# Patient Record
Sex: FEMALE | Race: WHITE | NOT HISPANIC OR LATINO | Employment: FULL TIME | URBAN - METROPOLITAN AREA
[De-identification: names, ages, dates, MRNs, and addresses within clinical notes are randomized per-mention and may not be internally consistent; named-entity substitution may affect disease eponyms.]

---

## 2021-10-08 NOTE — PROGRESS NOTES
Chief Complaint        Cirrhosis    History of Present Illness      Saida Ramírez is a 40 y.o. female who presents to [facility] as a new patient with a history of alcoholism and a new diagnosis of cirrhosis.  Patient reports that she was seen in the emergency department for left lower back and flank pain.  She had a CT scan done of the abdomen and pelvis without contrast while in the emergency department which displayed hepatic cirrhosis.  Patient reports she has been drinking for many years 1/5 of whiskey a day.  She reports that she stopped for about a month after being seen in the emergency department and hearing the word cirrhosis but she is started back.  She reports her uncle had cirrhosis as well.  No other liver disease in the family.  She denies IV drug abuse, Tylenol use, tattoos.  Patient denies abdominal swelling, leg swelling, confusion, yellowing of the skin or eyes.  Patient reports poor appetite worsening reflux and nausea.  She is on anything currently for reflux.  Patient denies fever, vomiting, weight loss, night sweats, melena, hematochezia, hematemesis.    CT abdomen performed on 06/5/2021 minimal amount of fluid surrounding the liver contour of the liver appears moderately nodular which can be seen in hepatic cirrhosis and luis carlos mesentery with the differentials of mesenteric panniculitis, mesenteric lymphoma and inflammatory bowel disease it could also be seen with hepatic cirrhosis  .  Most recent labs performed on 06/25/2021 hemoglobin 10.9, platelets 141, creatinine 0.72, sodium 143,AST-26, ALT-15 acute hepatitis panel negative.    Patient has never had a colonoscopy or EGD.        Results       Result Review :   The following data was reviewed by: Paulette Ramirez NP on 10/11/2021              Past Medical History       Past Medical History:   Diagnosis Date   • Hyperthyroidism        Past Surgical History:   Procedure Laterality Date   • ANKLE SURGERY  "Right          Current Outpatient Medications:   •  citalopram (CeleXA) 20 MG tablet, Take 20 mg by mouth Daily., Disp: , Rfl:   •  hydrOXYzine pamoate (VISTARIL) 25 MG capsule, Take 1 capsule by mouth Daily., Disp: , Rfl:   •  levothyroxine (SYNTHROID, LEVOTHROID) 125 MCG tablet, Take 125 mcg by mouth Daily., Disp: , Rfl:   •  pantoprazole (PROTONIX) 40 MG EC tablet, Take 1 tablet by mouth Daily., Disp: 30 tablet, Rfl: 5     No Known Allergies    Family History   Problem Relation Age of Onset   • Colon cancer Mother    • Cirrhosis Paternal Uncle         Social History     Social History Narrative   • Not on file       Objective       Objective     Vital Signs:   /72 (BP Location: Left arm, Patient Position: Sitting, Cuff Size: Adult)   Pulse 99   Ht 162.6 cm (64\")   Wt 84.2 kg (185 lb 9.6 oz)   SpO2 98%   BMI 31.86 kg/m²     Body mass index is 31.86 kg/m².    Physical Exam  Constitutional:       General: She is not in acute distress.     Appearance: Normal appearance. She is well-developed and normal weight.   Eyes:      Conjunctiva/sclera: Conjunctivae normal.      Pupils: Pupils are equal, round, and reactive to light.      Visual Fields: Right eye visual fields normal and left eye visual fields normal.   Cardiovascular:      Rate and Rhythm: Normal rate and regular rhythm.      Heart sounds: Normal heart sounds.   Pulmonary:      Effort: Pulmonary effort is normal. No retractions.      Breath sounds: Normal breath sounds and air entry.      Comments: Inspection of chest: normal appearance  Abdominal:      General: Bowel sounds are normal.      Palpations: Abdomen is soft.      Tenderness: There is no abdominal tenderness.      Comments: No appreciable hepatosplenomegaly   Musculoskeletal:      Cervical back: Neck supple.      Right lower leg: No edema.      Left lower leg: No edema.   Lymphadenopathy:      Cervical: No cervical adenopathy.   Skin:     Findings: No lesion.      Comments: Turgor normal "   Neurological:      Mental Status: She is alert and oriented to person, place, and time.   Psychiatric:         Mood and Affect: Affect normal.      Comments: Tearful and anxious              Assessment & Plan          Assessment and Plan    Diagnoses and all orders for this visit:    1. Alcoholic cirrhosis of liver without ascites (HCC) (Primary)  -     Hepatitis Panel, Acute; Future  -     CBC & Differential; Future  -     Comprehensive Metabolic Panel; Future  -     Ferritin; Future  -     Iron Profile  -     Protime-INR  -     Alpha - 1 - Antitrypsin Phenotype; Future  -     TELLY; Future  -     Anti-Smooth Muscle Antibody Titer; Future  -     Ceruloplasmin; Future  -     Mitochondrial Antibodies, M2; Future  -     US Abdomen Limited; Future  -     pantoprazole (PROTONIX) 40 MG EC tablet; Take 1 tablet by mouth Daily.  Dispense: 30 tablet; Refill: 5  -     Vitamin B12 & Folate; Future  -     Ambulatory Referral to Psychiatry    2. Depression, unspecified depression type  -     Ambulatory Referral to Psychiatry    3. Anxiety  -     Ambulatory Referral to Psychiatry    4. Alcohol dependence with unspecified alcohol-induced disorder (HCC)  -     Ambulatory Referral to Psychiatry      40-year-old female presenting the office today as a new patient with a new diagnosis of cirrhosis, anxiety, depression and alcohol dependence.  We have discussed liver disease extensively.  I have strongly encouraged the patient to stop drinking.  We have discussed her anxiety and depression and patient wishes to see a psychiatrist.  I have placed a referral for psychiatry.  I have educated the patient the benefits of seeing a therapist as well as joining a group such as AA.  I have ordered further evaluation related to the patient's CT scan displaying cirrhosis with the labs noted above to include: Hepatitis panel, CBC, CMP, ferritin, iron profile, PT/INR, alpha-1 antitrypsin, TELLY, smooth muscle, ceruloplasmin, AMA.  I have also  ordered an ultrasound for evaluation of the liver.  We have discussed the possible etiology related to elevated liver enzymes.  We discussed the importance of living a healthy lifestyle to include weight loss of 10%, cutting back on carbs, sugars and fried fatty foods, and limiting intake of soda and sugary drinks.  I have recommended adding vitamin E into the patient's current regimen.  We will call the patient with lab results and ultrasound results.  Patient is agreeable to this plan will call with any questions or concerns.  3-month follow-up.      I spent 65 minutes caring for Saida on this date of service. This time includes time spent by me in the following activities:preparing for the visit, reviewing tests, obtaining and/or reviewing a separately obtained history, performing a medically appropriate examination and/or evaluation , counseling and educating the patient/family/caregiver, ordering medications, tests, or procedures, documenting information in the medical record, independently interpreting results and communicating that information with the patient/family/caregiver and care coordination    Follow Up       Follow Up   Return in about 3 months (around 1/11/2022).  Patient was given instructions and counseling regarding her condition or for health maintenance advice. Please see specific information pulled into the AVS if appropriate.

## 2021-10-11 ENCOUNTER — OFFICE VISIT (OUTPATIENT)
Dept: GASTROENTEROLOGY | Facility: CLINIC | Age: 41
End: 2021-10-11

## 2021-10-11 ENCOUNTER — PREP FOR SURGERY (OUTPATIENT)
Dept: OTHER | Facility: HOSPITAL | Age: 41
End: 2021-10-11

## 2021-10-11 VITALS
BODY MASS INDEX: 31.69 KG/M2 | SYSTOLIC BLOOD PRESSURE: 122 MMHG | HEART RATE: 99 BPM | WEIGHT: 185.6 LBS | HEIGHT: 64 IN | DIASTOLIC BLOOD PRESSURE: 72 MMHG | OXYGEN SATURATION: 98 %

## 2021-10-11 DIAGNOSIS — K70.30 ALCOHOLIC CIRRHOSIS OF LIVER WITHOUT ASCITES (HCC): Primary | ICD-10-CM

## 2021-10-11 DIAGNOSIS — F41.9 ANXIETY: ICD-10-CM

## 2021-10-11 DIAGNOSIS — F10.29 ALCOHOL DEPENDENCE WITH UNSPECIFIED ALCOHOL-INDUCED DISORDER (HCC): ICD-10-CM

## 2021-10-11 DIAGNOSIS — F32.A DEPRESSION, UNSPECIFIED DEPRESSION TYPE: ICD-10-CM

## 2021-10-11 DIAGNOSIS — K21.9 GASTROESOPHAGEAL REFLUX DISEASE, UNSPECIFIED WHETHER ESOPHAGITIS PRESENT: ICD-10-CM

## 2021-10-11 PROCEDURE — 99205 OFFICE O/P NEW HI 60 MIN: CPT | Performed by: NURSE PRACTITIONER

## 2021-10-11 RX ORDER — LEVOTHYROXINE SODIUM 0.12 MG/1
125 TABLET ORAL DAILY
COMMUNITY

## 2021-10-11 RX ORDER — PANTOPRAZOLE SODIUM 40 MG/1
40 TABLET, DELAYED RELEASE ORAL DAILY
Qty: 30 TABLET | Refills: 5 | Status: SHIPPED | OUTPATIENT
Start: 2021-10-11

## 2021-10-11 RX ORDER — HYDROXYZINE PAMOATE 25 MG/1
1 CAPSULE ORAL DAILY
COMMUNITY
Start: 2021-09-21

## 2021-10-11 RX ORDER — CITALOPRAM 20 MG/1
20 TABLET ORAL DAILY
COMMUNITY
Start: 2021-09-21

## 2021-10-19 ENCOUNTER — PATIENT ROUNDING (BHMG ONLY) (OUTPATIENT)
Dept: GASTROENTEROLOGY | Facility: CLINIC | Age: 41
End: 2021-10-19

## 2021-10-19 NOTE — PROGRESS NOTES
October 19, 2021    Hello, may I speak with Saida Ramírez?    My name is Anni Capone    I am  with [facility]  [address] [place] [address] [phone].    Before we get started may I verify your date of birth? 1980    I am calling to officially welcome you to our practice and ask about your recent visit. Is this a good time to talk? No-Voicemail Left     Tell me about your visit with us. What things went well?        We're always looking for ways to make our patients' experiences even better. Do you have recommendations on ways we may improve?     Overall were you satisfied with your first visit to our practice?        I appreciate you taking the time to speak with me today. Is there anything else I can do for you?      Thank you, and have a great day.

## 2021-10-25 ENCOUNTER — TELEPHONE (OUTPATIENT)
Dept: GASTROENTEROLOGY | Facility: CLINIC | Age: 41
End: 2021-10-25

## 2021-10-25 NOTE — TELEPHONE ENCOUNTER
I left patient a voicemail reminding her to have her labs performed at her earliest convenience. I left a call back number if there are any questions or concerns.

## 2021-11-29 ENCOUNTER — TELEPHONE (OUTPATIENT)
Dept: GASTROENTEROLOGY | Facility: CLINIC | Age: 41
End: 2021-11-29

## 2021-12-07 ENCOUNTER — TELEPHONE (OUTPATIENT)
Dept: GASTROENTEROLOGY | Facility: CLINIC | Age: 41
End: 2021-12-07

## 2021-12-07 NOTE — TELEPHONE ENCOUNTER
Patient had an order to be scheduled at [facility] for an abdominal ultrasound. I was able to schedule patient at [facility] for 12/14/2021 at 8:00 AM. Arrival time of 7:30 AM for registration. NPO after midnight. I left patient a detailed voicemail of these details.

## 2021-12-20 ENCOUNTER — TELEPHONE (OUTPATIENT)
Dept: GASTROENTEROLOGY | Facility: CLINIC | Age: 41
End: 2021-12-20

## 2022-06-08 ENCOUNTER — HOSPITAL ENCOUNTER (EMERGENCY)
Dept: HOSPITAL 49 - FER | Age: 42
Discharge: HOME | End: 2022-06-08
Payer: SELF-PAY

## 2022-06-08 DIAGNOSIS — H60.13: Primary | ICD-10-CM
